# Patient Record
Sex: FEMALE | Race: WHITE | Employment: UNEMPLOYED | URBAN - METROPOLITAN AREA
[De-identification: names, ages, dates, MRNs, and addresses within clinical notes are randomized per-mention and may not be internally consistent; named-entity substitution may affect disease eponyms.]

---

## 2018-08-08 ENCOUNTER — HOSPITAL ENCOUNTER (OUTPATIENT)
Age: 23
Discharge: HOME | End: 2018-08-10
Payer: COMMERCIAL

## 2018-08-08 PROCEDURE — 88175 CYTOPATH C/V AUTO FLUID REDO: CPT

## 2019-02-14 ENCOUNTER — HOSPITAL ENCOUNTER (OUTPATIENT)
Age: 24
Discharge: HOME | End: 2019-02-16
Payer: COMMERCIAL

## 2019-02-14 PROCEDURE — 87591 N.GONORRHOEAE DNA AMP PROB: CPT

## 2019-02-14 PROCEDURE — 87624 HPV HI-RISK TYP POOLED RSLT: CPT

## 2019-02-14 PROCEDURE — 88175 CYTOPATH C/V AUTO FLUID REDO: CPT

## 2019-02-14 PROCEDURE — 87491 CHLMYD TRACH DNA AMP PROBE: CPT

## 2019-02-19 LAB
CHLAMYDIA TRACHOMATIS AMPLIFIED DET: NORMAL
N GONORRHOEAE AMPLIFIED DET: NORMAL

## 2019-02-21 LAB
CORRESPONDING PAP CASE #: NORMAL
HPV, HIGH RISK: POSITIVE

## 2019-04-02 ENCOUNTER — HOSPITAL ENCOUNTER (OUTPATIENT)
Age: 24
Discharge: HOME | End: 2019-04-04
Payer: COMMERCIAL

## 2019-04-02 PROCEDURE — 88305 TISSUE EXAM BY PATHOLOGIST: CPT

## 2019-08-19 ENCOUNTER — HOSPITAL ENCOUNTER (OUTPATIENT)
Age: 24
Discharge: HOME | End: 2019-08-21
Payer: MEDICAID

## 2019-08-19 PROCEDURE — 88175 CYTOPATH C/V AUTO FLUID REDO: CPT

## 2020-02-24 ENCOUNTER — HOSPITAL ENCOUNTER (OUTPATIENT)
Age: 25
Discharge: HOME | End: 2020-02-26
Payer: MEDICAID

## 2020-02-24 PROCEDURE — 88175 CYTOPATH C/V AUTO FLUID REDO: CPT

## 2020-10-02 ENCOUNTER — HOSPITAL ENCOUNTER (OUTPATIENT)
Age: 25
Discharge: HOME | End: 2020-10-04
Payer: MEDICAID

## 2020-10-02 PROCEDURE — 87591 N.GONORRHOEAE DNA AMP PROB: CPT

## 2020-10-02 PROCEDURE — 88175 CYTOPATH C/V AUTO FLUID REDO: CPT

## 2020-10-02 PROCEDURE — 87491 CHLMYD TRACH DNA AMP PROBE: CPT

## 2020-10-09 LAB
CHLAMYDIA BY THIN PREP: NEGATIVE
N. GONORRHOEAE DNA, THIN PREP: NEGATIVE
SOURCE: NORMAL

## 2022-05-17 ENCOUNTER — HOSPITAL ENCOUNTER (OUTPATIENT)
Dept: INFUSION THERAPY | Age: 27
Setting detail: INFUSION SERIES
Discharge: HOME | End: 2022-05-17
Payer: MEDICAID

## 2022-05-17 VITALS
SYSTOLIC BLOOD PRESSURE: 125 MMHG | OXYGEN SATURATION: 98 % | HEART RATE: 92 BPM | BODY MASS INDEX: 28.71 KG/M2 | DIASTOLIC BLOOD PRESSURE: 68 MMHG | TEMPERATURE: 97.6 F | HEIGHT: 62 IN | RESPIRATION RATE: 16 BRPM | WEIGHT: 156 LBS

## 2022-05-17 PROCEDURE — 6360000002 HC RX W HCPCS: Performed by: OBSTETRICS & GYNECOLOGY

## 2022-05-17 PROCEDURE — 96372 THER/PROPH/DIAG INJ SC/IM: CPT

## 2022-05-17 RX ADMIN — HUMAN RHO(D) IMMUNE GLOBULIN 300 MCG: 300 INJECTION, SOLUTION INTRAMUSCULAR at 14:41

## 2022-06-22 PROBLEM — O09.33 LATE PRENATAL CARE COMPLICATING PREGNANCY, THIRD TRIMESTER: Status: ACTIVE | Noted: 2022-06-22

## 2022-06-22 PROBLEM — O26.899 RH NEGATIVE STATE IN ANTEPARTUM PERIOD: Status: ACTIVE | Noted: 2022-06-22

## 2022-06-22 PROBLEM — Z34.03 PRIMIGRAVIDA IN THIRD TRIMESTER: Status: ACTIVE | Noted: 2022-06-22

## 2022-06-22 PROBLEM — U07.1 COVID-19 AFFECTING PREGNANCY IN SECOND TRIMESTER: Status: ACTIVE | Noted: 2022-06-22

## 2022-06-22 PROBLEM — O98.512 COVID-19 AFFECTING PREGNANCY IN SECOND TRIMESTER: Status: ACTIVE | Noted: 2022-06-22

## 2022-06-22 PROBLEM — O09.93 HIGH-RISK PREGNANCY, THIRD TRIMESTER: Status: ACTIVE | Noted: 2022-06-22

## 2022-06-22 PROBLEM — Z67.91 RH NEGATIVE STATE IN ANTEPARTUM PERIOD: Status: ACTIVE | Noted: 2022-06-22

## 2022-06-22 PROBLEM — O99.013 ANEMIA DURING PREGNANCY IN THIRD TRIMESTER: Status: ACTIVE | Noted: 2022-06-22

## 2022-06-22 PROBLEM — O98.812 CHLAMYDIA INFECTION AFFECTING PREGNANCY IN SECOND TRIMESTER: Status: ACTIVE | Noted: 2022-06-22

## 2022-06-22 PROBLEM — A74.9 CHLAMYDIA INFECTION AFFECTING PREGNANCY IN SECOND TRIMESTER: Status: ACTIVE | Noted: 2022-06-22

## 2022-07-25 ENCOUNTER — APPOINTMENT (OUTPATIENT)
Dept: LABOR AND DELIVERY | Age: 27
DRG: 560 | End: 2022-07-25
Payer: MEDICAID

## 2022-07-25 ENCOUNTER — HOSPITAL ENCOUNTER (INPATIENT)
Age: 27
LOS: 3 days | Discharge: HOME | DRG: 560 | End: 2022-07-28
Attending: OBSTETRICS & GYNECOLOGY | Admitting: OBSTETRICS & GYNECOLOGY
Payer: MEDICAID

## 2022-07-25 ENCOUNTER — ANESTHESIA EVENT (OUTPATIENT)
Dept: LABOR AND DELIVERY | Age: 27
DRG: 560 | End: 2022-07-25
Payer: MEDICAID

## 2022-07-25 ENCOUNTER — ANESTHESIA (OUTPATIENT)
Dept: LABOR AND DELIVERY | Age: 27
DRG: 560 | End: 2022-07-25
Payer: MEDICAID

## 2022-07-25 DIAGNOSIS — G89.18 PAIN FOLLOWING SURGERY OR PROCEDURE: Primary | ICD-10-CM

## 2022-07-25 PROBLEM — Z3A.39 39 WEEKS GESTATION OF PREGNANCY: Status: ACTIVE | Noted: 2022-07-25

## 2022-07-25 LAB
ABO/RH: NORMAL
AMPHETAMINE SCREEN, URINE: NOT DETECTED
ANTIBODY SCREEN: NORMAL
BARBITURATE SCREEN URINE: NOT DETECTED
BENZODIAZEPINE SCREEN, URINE: NOT DETECTED
CANNABINOID SCREEN URINE: NOT DETECTED
COCAINE METABOLITE SCREEN URINE: NOT DETECTED
FENTANYL SCREEN, URINE: NOT DETECTED
HCT VFR BLD CALC: 35.8 % (ref 34–48)
HEMOGLOBIN: 11.7 G/DL (ref 11.5–15.5)
Lab: NORMAL
MCH RBC QN AUTO: 27.3 PG (ref 26–35)
MCHC RBC AUTO-ENTMCNC: 32.7 % (ref 32–34.5)
MCV RBC AUTO: 83.6 FL (ref 80–99.9)
METHADONE SCREEN, URINE: NOT DETECTED
OPIATE SCREEN URINE: NOT DETECTED
OXYCODONE URINE: NOT DETECTED
PDW BLD-RTO: 18.3 FL (ref 11.5–15)
PHENCYCLIDINE SCREEN URINE: NOT DETECTED
PLATELET # BLD: 243 E9/L (ref 130–450)
PMV BLD AUTO: 11.9 FL (ref 7–12)
RBC # BLD: 4.28 E12/L (ref 3.5–5.5)
WBC # BLD: 9.2 E9/L (ref 4.5–11.5)

## 2022-07-25 PROCEDURE — 6360000002 HC RX W HCPCS: Performed by: OBSTETRICS & GYNECOLOGY

## 2022-07-25 PROCEDURE — 2500000003 HC RX 250 WO HCPCS

## 2022-07-25 PROCEDURE — 1220000001 HC SEMI PRIVATE L&D R&B

## 2022-07-25 PROCEDURE — 51701 INSERT BLADDER CATHETER: CPT

## 2022-07-25 PROCEDURE — 85027 COMPLETE CBC AUTOMATED: CPT

## 2022-07-25 PROCEDURE — 6360000002 HC RX W HCPCS

## 2022-07-25 PROCEDURE — 86900 BLOOD TYPING SEROLOGIC ABO: CPT

## 2022-07-25 PROCEDURE — 36415 COLL VENOUS BLD VENIPUNCTURE: CPT

## 2022-07-25 PROCEDURE — 80307 DRUG TEST PRSMV CHEM ANLYZR: CPT

## 2022-07-25 PROCEDURE — 2580000003 HC RX 258: Performed by: OBSTETRICS & GYNECOLOGY

## 2022-07-25 PROCEDURE — 86901 BLOOD TYPING SEROLOGIC RH(D): CPT

## 2022-07-25 PROCEDURE — 86850 RBC ANTIBODY SCREEN: CPT

## 2022-07-25 RX ORDER — SODIUM CHLORIDE, SODIUM LACTATE, POTASSIUM CHLORIDE, AND CALCIUM CHLORIDE .6; .31; .03; .02 G/100ML; G/100ML; G/100ML; G/100ML
1000 INJECTION, SOLUTION INTRAVENOUS PRN
Status: DISCONTINUED | OUTPATIENT
Start: 2022-07-25 | End: 2022-07-26

## 2022-07-25 RX ORDER — FENTANYL CITRATE 50 UG/ML
INJECTION, SOLUTION INTRAMUSCULAR; INTRAVENOUS
Status: COMPLETED
Start: 2022-07-25 | End: 2022-07-25

## 2022-07-25 RX ORDER — SODIUM CHLORIDE 9 MG/ML
25 INJECTION, SOLUTION INTRAVENOUS PRN
Status: DISCONTINUED | OUTPATIENT
Start: 2022-07-25 | End: 2022-07-26

## 2022-07-25 RX ORDER — CARBOPROST TROMETHAMINE 250 UG/ML
250 INJECTION, SOLUTION INTRAMUSCULAR PRN
Status: DISCONTINUED | OUTPATIENT
Start: 2022-07-25 | End: 2022-07-26

## 2022-07-25 RX ORDER — ONDANSETRON 2 MG/ML
4 INJECTION INTRAMUSCULAR; INTRAVENOUS EVERY 6 HOURS PRN
Status: DISCONTINUED | OUTPATIENT
Start: 2022-07-25 | End: 2022-07-26

## 2022-07-25 RX ORDER — METHYLERGONOVINE MALEATE 0.2 MG/ML
200 INJECTION INTRAVENOUS PRN
Status: DISCONTINUED | OUTPATIENT
Start: 2022-07-25 | End: 2022-07-26

## 2022-07-25 RX ORDER — FENTANYL CITRATE 50 UG/ML
INJECTION, SOLUTION INTRAMUSCULAR; INTRAVENOUS PRN
Status: DISCONTINUED | OUTPATIENT
Start: 2022-07-25 | End: 2022-07-28 | Stop reason: SDUPTHER

## 2022-07-25 RX ORDER — DOCUSATE SODIUM 100 MG/1
100 CAPSULE, LIQUID FILLED ORAL 2 TIMES DAILY
Status: DISCONTINUED | OUTPATIENT
Start: 2022-07-25 | End: 2022-07-26

## 2022-07-25 RX ORDER — ZOLPIDEM TARTRATE 10 MG/1
10 TABLET ORAL NIGHTLY PRN
COMMUNITY
End: 2022-10-07

## 2022-07-25 RX ORDER — SODIUM CHLORIDE, SODIUM LACTATE, POTASSIUM CHLORIDE, AND CALCIUM CHLORIDE .6; .31; .03; .02 G/100ML; G/100ML; G/100ML; G/100ML
500 INJECTION, SOLUTION INTRAVENOUS PRN
Status: DISCONTINUED | OUTPATIENT
Start: 2022-07-25 | End: 2022-07-26

## 2022-07-25 RX ORDER — LIDOCAINE HYDROCHLORIDE 10 MG/ML
INJECTION, SOLUTION INFILTRATION; PERINEURAL
Status: COMPLETED
Start: 2022-07-25 | End: 2022-07-26

## 2022-07-25 RX ORDER — SODIUM CHLORIDE, SODIUM LACTATE, POTASSIUM CHLORIDE, CALCIUM CHLORIDE 600; 310; 30; 20 MG/100ML; MG/100ML; MG/100ML; MG/100ML
INJECTION, SOLUTION INTRAVENOUS CONTINUOUS
Status: DISCONTINUED | OUTPATIENT
Start: 2022-07-25 | End: 2022-07-26

## 2022-07-25 RX ORDER — MISOPROSTOL 200 UG/1
800 TABLET ORAL PRN
Status: DISCONTINUED | OUTPATIENT
Start: 2022-07-25 | End: 2022-07-26

## 2022-07-25 RX ORDER — ACETAMINOPHEN 650 MG
TABLET, EXTENDED RELEASE ORAL
Status: COMPLETED
Start: 2022-07-25 | End: 2022-07-26

## 2022-07-25 RX ORDER — SODIUM CHLORIDE 0.9 % (FLUSH) 0.9 %
5-40 SYRINGE (ML) INJECTION PRN
Status: DISCONTINUED | OUTPATIENT
Start: 2022-07-25 | End: 2022-07-26

## 2022-07-25 RX ADMIN — Medication 1 MILLI-UNITS/MIN: at 10:55

## 2022-07-25 RX ADMIN — SODIUM CHLORIDE, POTASSIUM CHLORIDE, SODIUM LACTATE AND CALCIUM CHLORIDE: 600; 310; 30; 20 INJECTION, SOLUTION INTRAVENOUS at 10:01

## 2022-07-25 RX ADMIN — FENTANYL CITRATE 25 MCG: 50 INJECTION, SOLUTION INTRAMUSCULAR; INTRAVENOUS at 18:44

## 2022-07-25 RX ADMIN — Medication 15 ML/HR: at 18:50

## 2022-07-25 ASSESSMENT — LIFESTYLE VARIABLES: SMOKING_STATUS: 0

## 2022-07-25 NOTE — ANESTHESIA PROCEDURE NOTES
CSE Block    Patient location during procedure: OB  Reason for block: labor epidural  Staffing  Performed: other anesthesia staff   Anesthesiologist: Shanita Garcia MD  Resident/CRNA: APRIL Linares - CRNA  Other anesthesia staff: Emmanuelle Alfaro RN  CSE  Patient position: sitting  Prep: ChloraPrep  Patient monitoring: continuous pulse ox  Approach: midline  Provider prep: mask and sterile gloves  Spinal Needle  Needle type: Sprotte tip   Needle gauge: 25 G  Needle length: 4.75 in  Epidural Needle  Injection technique: SHAYY air  Needle type: Tuohy   Needle gauge: 17 G  Needle length: 3.5 in  Needle insertion depth: 7 cm  Catheter  Catheter type: end hole  Catheter size: 20.  Catheter at skin depth: 12 cm  Test dose: positive (Pt HR increased.)  Assessment  Hemodynamics: stable  Additional Notes  First epidural placed and had to be removed for positive test dose. Second epidural placed with negative test dose, taped in place.  Pt tolerated well, vital signs stable and HR returned to baseline  Preanesthetic Checklist  Completed: patient identified, IV checked, site marked, risks and benefits discussed, surgical/procedural consents, equipment checked, pre-op evaluation, timeout performed, anesthesia consent given, oxygen available, monitors applied/VS acknowledged and blood product R/B/A discussed and consented
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
## 2022-07-25 NOTE — PROGRESS NOTES
L&D PROGRESS NOTE:    Patient:  Laureen Stewart     Admit Date:  7/25/2022  9:13 AM  Medical Record Number:  [id number]   Today's Date: 7/25/2022    S: Dr Jessica Waite MD requesting placement of IUPC. O:   Vitals:    07/25/22 1110 07/25/22 1139 07/25/22 1140 07/25/22 1356   BP:  (!) 137/98  (!) 145/91   Pulse:  (!) 108  88   Resp:       Temp:       TempSrc:       SpO2:   97%    Weight: 182 lb (82.6 kg)      Height: 5' 2\" (1.575 m)        FHR:  Reassuring. Cervix: 2 cm / 80% / soft / -1. TOCO:  2 minutes-3 minutes    A:27 y.o. w female at 43w3d Sam Gigi  High risk primigravida  Gestational hypertension  COVID-19 affecting pregnancy at 18 weeks  Late prenatal care  Iron deficiency anemia pregnancy  Rh- status  History of chlamydia in second trimester (treated with negative test of cure)  History of anxiety/depression/ADHD on meds prior to pregnancy   Patient Active Problem List   Diagnosis    Anxiety/Depression/ADHD disorder - on meds since 9 yo    High-risk pregnancy, third trimester    Primigravida in third trimester    COVID-19 affecting pregnancy in second trimester at around 18 weeks along (EFW/KARLI at 29, 29 and 38 weeks)     Late prenatal care complicating pregnancy, third trimester (16 4/7 weeks)    Anemia during pregnancy in third trimester- Taking FeSO4 BID    Rh negative state in antepartum period- Had rhogam    Chlamydia infection affecting pregnancy in second trimester (16 weeks -> treated -> OLIVIA negative    39 4/7 weeks gestation of pregnancy    Gestational hypertension, third trimester starting at 37 weeks     Fetal status: Reassuring  GBS: negative    P: AROM without difficulty using the [place]. Ruptured light meconium. IUPC placed posteriorly without difficulty. IV bolus/O2/position changes prn  See orders per Dr. Jessica Waite MD.    Jessica Waite MD, M.D.  FACOG  7/25/2022 2:15 PM

## 2022-07-25 NOTE — PROGRESS NOTES
Spoke with Dr. Mary Clemente in patient room, updated on BP's, gbs status. declines pih labs. New orders noted.
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
## 2022-07-25 NOTE — PROGRESS NOTES
.39w4d. Patient presents for scheduled induction. Denies vb, lof, ctx. Reports good fm. Efm placed. Call light within reach.

## 2022-07-26 LAB
FETAL SCREEN: NORMAL
RHIG LOT NUMBER: NORMAL

## 2022-07-26 PROCEDURE — 6360000002 HC RX W HCPCS: Performed by: OBSTETRICS & GYNECOLOGY

## 2022-07-26 PROCEDURE — 85461 HEMOGLOBIN FETAL: CPT

## 2022-07-26 PROCEDURE — 7200000001 HC VAGINAL DELIVERY

## 2022-07-26 PROCEDURE — 2500000003 HC RX 250 WO HCPCS

## 2022-07-26 PROCEDURE — 36415 COLL VENOUS BLD VENIPUNCTURE: CPT

## 2022-07-26 PROCEDURE — 1220000000 HC SEMI PRIVATE OB R&B

## 2022-07-26 PROCEDURE — 6370000000 HC RX 637 (ALT 250 FOR IP): Performed by: OBSTETRICS & GYNECOLOGY

## 2022-07-26 RX ORDER — OXYCODONE HYDROCHLORIDE 5 MG/1
5 TABLET ORAL EVERY 4 HOURS PRN
Status: DISCONTINUED | OUTPATIENT
Start: 2022-07-26 | End: 2022-07-28 | Stop reason: HOSPADM

## 2022-07-26 RX ORDER — MODIFIED LANOLIN
OINTMENT (GRAM) TOPICAL PRN
Status: DISCONTINUED | OUTPATIENT
Start: 2022-07-26 | End: 2022-07-28 | Stop reason: HOSPADM

## 2022-07-26 RX ORDER — OXYCODONE HYDROCHLORIDE 5 MG/1
10 TABLET ORAL EVERY 4 HOURS PRN
Status: DISCONTINUED | OUTPATIENT
Start: 2022-07-26 | End: 2022-07-28 | Stop reason: HOSPADM

## 2022-07-26 RX ORDER — ACETAMINOPHEN 500 MG
1000 TABLET ORAL EVERY 8 HOURS PRN
Status: DISCONTINUED | OUTPATIENT
Start: 2022-07-26 | End: 2022-07-28 | Stop reason: HOSPADM

## 2022-07-26 RX ORDER — DOCUSATE SODIUM 100 MG/1
100 CAPSULE, LIQUID FILLED ORAL 2 TIMES DAILY
Status: DISCONTINUED | OUTPATIENT
Start: 2022-07-26 | End: 2022-07-28 | Stop reason: HOSPADM

## 2022-07-26 RX ORDER — IBUPROFEN 600 MG/1
600 TABLET ORAL EVERY 6 HOURS PRN
Status: DISCONTINUED | OUTPATIENT
Start: 2022-07-26 | End: 2022-07-28 | Stop reason: HOSPADM

## 2022-07-26 RX ORDER — PRENATAL WITH FERROUS FUM AND FOLIC ACID 3080; 920; 120; 400; 22; 1.84; 3; 20; 10; 1; 12; 200; 27; 25; 2 [IU]/1; [IU]/1; MG/1; [IU]/1; MG/1; MG/1; MG/1; MG/1; MG/1; MG/1; UG/1; MG/1; MG/1; MG/1; MG/1
1 TABLET ORAL
Status: DISCONTINUED | OUTPATIENT
Start: 2022-07-26 | End: 2022-07-28 | Stop reason: HOSPADM

## 2022-07-26 RX ADMIN — OXYCODONE 10 MG: 5 TABLET ORAL at 20:04

## 2022-07-26 RX ADMIN — IBUPROFEN 600 MG: 600 TABLET ORAL at 03:34

## 2022-07-26 RX ADMIN — HUMAN RHO(D) IMMUNE GLOBULIN 300 MCG: 300 INJECTION, SOLUTION INTRAMUSCULAR at 16:22

## 2022-07-26 RX ADMIN — Medication 87.3 MILLI-UNITS/MIN: at 00:38

## 2022-07-26 RX ADMIN — Medication: at 00:17

## 2022-07-26 RX ADMIN — IBUPROFEN 600 MG: 600 TABLET ORAL at 11:44

## 2022-07-26 RX ADMIN — Medication 909 MILLI-UNITS/MIN: at 00:27

## 2022-07-26 RX ADMIN — OXYCODONE 5 MG: 5 TABLET ORAL at 16:03

## 2022-07-26 RX ADMIN — Medication: at 03:35

## 2022-07-26 RX ADMIN — LIDOCAINE HYDROCHLORIDE: 10 INJECTION, SOLUTION INFILTRATION; PERINEURAL at 01:08

## 2022-07-26 RX ADMIN — DOCUSATE SODIUM 100 MG: 100 CAPSULE, LIQUID FILLED ORAL at 20:05

## 2022-07-26 RX ADMIN — DOCUSATE SODIUM 100 MG: 100 CAPSULE, LIQUID FILLED ORAL at 11:44

## 2022-07-26 ASSESSMENT — PAIN DESCRIPTION - DESCRIPTORS
DESCRIPTORS: BURNING;SORE;TENDER
DESCRIPTORS: CRAMPING
DESCRIPTORS: SORE

## 2022-07-26 ASSESSMENT — PAIN DESCRIPTION - ORIENTATION
ORIENTATION: MID;LOWER
ORIENTATION: LOWER

## 2022-07-26 ASSESSMENT — PAIN DESCRIPTION - PAIN TYPE
TYPE: ACUTE PAIN
TYPE: ACUTE PAIN

## 2022-07-26 ASSESSMENT — PAIN DESCRIPTION - LOCATION
LOCATION: PERINEUM
LOCATION: ABDOMEN
LOCATION: ABDOMEN

## 2022-07-26 ASSESSMENT — PAIN - FUNCTIONAL ASSESSMENT
PAIN_FUNCTIONAL_ASSESSMENT: ACTIVITIES ARE NOT PREVENTED

## 2022-07-26 ASSESSMENT — PAIN DESCRIPTION - FREQUENCY
FREQUENCY: INTERMITTENT
FREQUENCY: INTERMITTENT

## 2022-07-26 ASSESSMENT — PAIN SCALES - GENERAL
PAINLEVEL_OUTOF10: 7
PAINLEVEL_OUTOF10: 3
PAINLEVEL_OUTOF10: 6

## 2022-07-26 ASSESSMENT — PAIN DESCRIPTION - ONSET: ONSET: GRADUAL

## 2022-07-26 NOTE — PROGRESS NOTES
Patient and significant other oriented to room and unit on admission. Reviewed safe sleep, visitation, patient right's, and admission paperwork. Instructed patient to call cell phone number provided or press call light if anything is needed. Call light within reach. All questions and concerns addressed at this time.

## 2022-07-26 NOTE — PROGRESS NOTES
Assisted to restroom, equal strength in both legs, denies weakness , no dizziness, ambulated without difficulty, instructed on trung care and returned to bed.

## 2022-07-26 NOTE — PROGRESS NOTES
Called and updated Dr Colin Space on patient prolonged deceleration, pitocin paused and LR bolus going. Updated Doctor on strip and SVE orders given.

## 2022-07-26 NOTE — LACTATION NOTE
Instructed on normal infant behavior, benefits of colostrum/breast milk for baby and mom,  benefits of skin to skin and components of safe positioning. Encouraged rooming-in and avoidance of pacifier use until breastfeeding is well established. Reviewed latch techniques, positioning, signs of effective milk transfer, waking techniques and the importance of frequent feedings- 8-12 times/ 24 hrs to stimulate/maintain milk production. Taught hand expression and encouraged to express drops of colostrum at start of feeding. Reviewed hunger cues and expected urine/stool output and transition. Encouraged to feed infant as often and for as long as the infant wishes to do so. Assisted with positioning. Baby was tired but did not latch. Went over breastfeeding resources available after discharge. Offered support and encouraged to call for assistance or concerns.

## 2022-07-26 NOTE — L&D DELIVERY NOTE
Department of Obstetrics and Gynecology  Spontaneous Vaginal Delivery Note    Patient:  Matthieu Tarango     Admit Date:  2022  9:13 AM  Medical Record Number:  [id number]   Procedure Date: 2022 1:42 AM     Pre-delivery Diagnosis:  High risk primigravida at 44 5/7w; Gestational hypertension; COVID-19 affecting pregnancy at 18 weeks; Late prenatal care; Iron deficiency anemia pregnancy  Rh- status; History of chlamydia in second trimester (treated with negative test of cure);  History of anxiety/depression/ADHD on meds prior to pregnancy  Patient Active Problem List   Diagnosis    Anxiety/Depression/ADHD disorder - on meds since 11 yo    High-risk pregnancy, third trimester    Primigravida in third trimester    COVID-19 affecting pregnancy in second trimester at around 18 weeks along (EFW/KARLI at 29, 29 and 38 weeks)     Late prenatal care complicating pregnancy, third trimester (16 4/7 weeks)    Anemia during pregnancy in third trimester- Taking FeSO4 BID    Rh negative state in antepartum period- Had rhogam    Chlamydia infection affecting pregnancy in second trimester (16 weeks -> treated -> OLIVIA negative    39 5/7 weeks gestation of pregnancy    Gestational hypertension, third trimester starting at 37 weeks    Meconium in amniotic fluid     Post-delivery Diagnosis:  Living  infant Male, second-degree perineal laceration, right periurethral laceration  Patient Active Problem List   Diagnosis    Anxiety/Depression/ADHD disorder - on meds since 11 yo    High-risk pregnancy, third trimester    Primigravida in third trimester    COVID-19 affecting pregnancy in second trimester at around 18 weeks along (EFW/KARLI at 29, 29 and 38 weeks)     Late prenatal care complicating pregnancy, third trimester (16 4/7 weeks)    Anemia during pregnancy in third trimester- Taking FeSO4 BID    Rh negative state in antepartum period- Had rhogam    Chlamydia infection affecting pregnancy in second trimester (16 weeks -> treated -> OLIVIA negative    39 5/7 weeks gestation of pregnancy    Gestational hypertension, third trimester starting at 37 weeks    Meconium in amniotic fluid     (normal spontaneous vaginal delivery)    Term birth of male     Second degree laceration of perineum, delivered, current hospitalization    Right periurethral laceration, delivered, current hospitalization       Information for the patient's :  Chano Arellano [address] [facility] [[id number]]   Normal Spontaneous Vaginal Delivery, uncomplicated     Delivering Physician: Sarahy Villegas Wt:   Information for the patient's :  Chano Nugent [[id number]]   8 pounds 6.4 ounces  3810 g     APGARS:     Information for the patient's :  Chano Nugent [[id number]]   1 minute: 9  5 minute: 9    Anesthesia:  epidural anesthesia 1% lidocaine locally for repair    Application and Delivery:  32 y.o. W female  at 39w5d admitted for Pitocin induction of labor with a Tiwari score equal to 4 gestational hypertension and a history of COVID-19 at 18 weeks who progressed to complete post amniotomy and delivered Cephalic, right occiput anterior presentation via  @ 0018, under epidural anesthesia anesthesia,  over an intact perineum with a resulting 2nd degree laceration and a right periurethral mucosal laceration, without dystocia or complication, a Live Born Male infant, weighing 8# 6oz, 3810 grams, Meconium fluid at delivery, bulb suctioned on perineum. A nuchal cord was not present. A delayed cord clamping was performed. Spontaneous cry,  Apgar's 1 minute:  9; 5 minute:  9. The placenta was delivered with gentle traction and was noted to be intact, whole, and that the umbilical cord had three vessels noted. Episiotomy was not needed due to a spontaneous 2nd degree (subcutaneous tissue involved) perineal/vaginal laceration and a right periurethral mucosal laceration.   Repair of the perineal laceration was performed in layers, per routine with Vicryl 3-0 suture. The right urethral laceration was repaired with simple interrupted stitches of the same suture. Cervix, rectum, sphincter intact. Sponge, needle, and instrument counts correct x 2. Delivery Summary:  Mother's Information      Labor Events     Labor?: No  Cervical Ripening: N/A  Now     Iantha Frankel [[id number]]      Labor Events     Labor?: No   Steroids?: None  Cervical Ripening Date/Time: N/    Antibiotics Received during Labor?: No  Rupture Identifier: Sac 1   Rupture Date/Time: 22 13:50:00   Rupture Type: AROM  Fluid Color: Meconium  Meconium Consistency: Thin  Fluid Odor: None  Fluid Volume:  Moderate  Induction: Oxytocin  Labor Complications: None    Anesthesia    Method: Epidural, Local    Start Pushing      Labor onset date/time: 21 19:29:00 Now     Dilation complete date/time: 22 23:10:00 EDT Now     Start pushing date/time: 2022 23:20:00     Labor Length    1st stage: 3h 41m  2nd stage: 1h 08m  3rd stage: 0h 09m    Delivery ()      Delivery Date/Time:  22 00:18:00   Delivery Type: Vaginal, Spontaneous     Presentation    Presentation: Vertex  Position: Right  _: Occiput  _: Anterior    Shoulder Dystocia    Shoulder Dystocia Present?: No  Assisted Delivery Details    Forceps Attempted?: No  Vacuum Extractor Attempted?: No    Cord    Vessels: 3 Vessels  Complications: None  Delayed Cord Clamping?: Yes  Cord Clamped Date/Time: 2022 12:19:00  Cord Blood Disposition: Lab  Gases Sent?: Yes    Placenta    Date/Time: 2022 00:27:00  Removal: Spontaneous  Appearance: Intact  Disposition: Placenta Refrigerator    Lacerations    Episiotomy: None  Perineal Lacerations: 2nd  Other Lacerations: periurethral laceration  Periurethral Laceration: Right Repaired?: Yes   Number of Repair Packets: 3    Vaginal Counts    Initial Count Personnel: DONALDO  Initial Count Verified By: Juancho Hall    Sponges Needles Instruments   Initial Counts Correct Correct Correct   Final Counts Correct Correct Correct   Final Count Personnel: Rosana Bearden  Final Count Verified By: [place]  Accurate Final Count?: Yes  If the count is incorrect due to Intentionally Retained Foreign Object (IRFO) add the IRFO LDA in Lines/Drains. Add LDA: Link to [facility]    Blood Loss  Mother: [facility] #[id number]     Start of Mother's Information      Delivery Blood Loss  21 1929 - 22 0018      175 mL       End of Mother's Information  Mother: [facility] #[id number]      Delivery Providers    Delivering clinician: Kaia Main MD     Provider Role    Kaia Main MD Obstetrician    Pablo Rojas RN Primary Nurse    Kristi Snell RN Primary [place] Nurse          Assessment    Living Status: Living     Apgar Scoring Key:    0 1 2    Skin Color: Blue or pale Acrocyanotic Completely pink    Heart Rate: Absent <100 bpm >100 bpm    Reflex Irritability: No response Grimace Cry or active withdrawal    Muscle Tone: Limp Some flexion Active motion    Respiratory Effort: Absent Weak cry; hypoventilation Good, crying                      Skin Color:   Heart Rate:   Reflex Irritability:   Muscle Tone:   Respiratory Effort:    Total:            1 Minute:    1    2    2    2    2    9        Apgar 1 total from OB History    5 Minute:    1    2    2    2    2    9        Apgar 5 total from OB History              Apgars Assigned By: Chet Reyes         Resuscitation    Method: Bulb Suction, Room Air, Stimulation        [place] Measurements      Birth Weight: 3810 g Birth Length: 0.521 m     Head Circumference: 0.345 m          Title      Skin to Skin Initiation Date/Time: 22 00:18:00 EDT     Skin to Skin With: Mother     Skin to Skin End Date/Time: 22 01:40:00     Breastfeeding Initiated Date/Time: 2022 01:45:00      Specimen: None     Quantitative blood loss: 175    Condition:  infant stable to general nursery and mother stable to maternity    Blood Type and Rh: A NEG    Rubella Immunity Status:   Immune         Infant Feeding:    breast    Attending Attestation: I performed the procedure.     Kaia Main MD MD, Anthony Vo  7/26/2022 1:42 AM

## 2022-07-26 NOTE — ANESTHESIA POSTPROCEDURE EVALUATION
Department of Anesthesiology  Postprocedure Note    Patient: Priyanka Ramos  MRN: [id number]  YOB: 1995  Date of evaluation: 7/26/2022      Procedure Summary     Date: 07/25/22 Room / Location:     [facility]est[facility] Stop:     Procedure: Labor Analgesia Diagnosis:     Scheduled Providers:  Responsible Provider: Shanita Garcia MD    Anesthesia Type: epidural ASA Status: 3          Anesthesia Type: No value filed.     Angeline Phase I: Angeline Score: 10    Angeline Phase II:        Anesthesia Post Evaluation    Patient location during evaluation: bedside  Patient participation: complete - patient participated  Level of consciousness: awake and alert  Pain score: 2  Airway patency: patent  Nausea & Vomiting: no nausea and no vomiting  Complications: no  Cardiovascular status: blood pressure returned to baseline and hemodynamically stable  Respiratory status: acceptable  Hydration status: stable

## 2022-07-27 PROCEDURE — 6370000000 HC RX 637 (ALT 250 FOR IP): Performed by: OBSTETRICS & GYNECOLOGY

## 2022-07-27 PROCEDURE — 1220000000 HC SEMI PRIVATE OB R&B

## 2022-07-27 RX ORDER — CITALOPRAM 20 MG/1
20 TABLET ORAL DAILY
Status: DISCONTINUED | OUTPATIENT
Start: 2022-07-27 | End: 2022-07-28 | Stop reason: HOSPADM

## 2022-07-27 RX ADMIN — DOCUSATE SODIUM 100 MG: 100 CAPSULE, LIQUID FILLED ORAL at 08:53

## 2022-07-27 RX ADMIN — OXYCODONE 10 MG: 5 TABLET ORAL at 02:24

## 2022-07-27 RX ADMIN — IBUPROFEN 600 MG: 600 TABLET ORAL at 20:42

## 2022-07-27 RX ADMIN — IBUPROFEN 600 MG: 600 TABLET ORAL at 08:53

## 2022-07-27 RX ADMIN — CITALOPRAM HYDROBROMIDE 20 MG: 20 TABLET ORAL at 20:42

## 2022-07-27 ASSESSMENT — PAIN DESCRIPTION - LOCATION
LOCATION: ABDOMEN
LOCATION: ABDOMEN;VAGINA
LOCATION: ABDOMEN

## 2022-07-27 ASSESSMENT — PAIN DESCRIPTION - FREQUENCY: FREQUENCY: INTERMITTENT

## 2022-07-27 ASSESSMENT — PAIN - FUNCTIONAL ASSESSMENT
PAIN_FUNCTIONAL_ASSESSMENT: ACTIVITIES ARE NOT PREVENTED
PAIN_FUNCTIONAL_ASSESSMENT: ACTIVITIES ARE NOT PREVENTED

## 2022-07-27 ASSESSMENT — PAIN DESCRIPTION - ONSET: ONSET: ON-GOING

## 2022-07-27 ASSESSMENT — PAIN SCALES - GENERAL
PAINLEVEL_OUTOF10: 3
PAINLEVEL_OUTOF10: 0
PAINLEVEL_OUTOF10: 3
PAINLEVEL_OUTOF10: 7

## 2022-07-27 ASSESSMENT — PAIN DESCRIPTION - DESCRIPTORS
DESCRIPTORS: CRAMPING
DESCRIPTORS: CRAMPING
DESCRIPTORS: SORE

## 2022-07-27 ASSESSMENT — PAIN DESCRIPTION - PAIN TYPE: TYPE: ACUTE PAIN

## 2022-07-27 ASSESSMENT — PAIN DESCRIPTION - ORIENTATION: ORIENTATION: LOWER

## 2022-07-27 NOTE — CARE COORDINATION
SW Discharge Planning   SW received consult for \" ppd score 20\"     LUIS ALBERTO met with Sallie Pedersen and provided her with support, education and resources for post partum depression.  Due to how high Jovanna's score is, Jovanna's nurseLj also reported that she would alert physician      Electronically signed by GRANT Solano on 7/27/2022 at 2:47 PM

## 2022-07-27 NOTE — PROGRESS NOTES
[employer] [place] Hearing screening results were discussed with parent. Questions answered. Brochure given to parent. Advised to monitor developmental milestones and contact physician for any concerns.    Chandrakant Cole, AuD

## 2022-07-27 NOTE — PROGRESS NOTES
Pt tearful when entered room, said  she has been feeling anxious. Pt was taking Celexa before pregnancy. Will update physician.

## 2022-07-28 VITALS
DIASTOLIC BLOOD PRESSURE: 82 MMHG | BODY MASS INDEX: 33.49 KG/M2 | HEART RATE: 64 BPM | OXYGEN SATURATION: 96 % | WEIGHT: 182 LBS | TEMPERATURE: 98.1 F | RESPIRATION RATE: 16 BRPM | HEIGHT: 62 IN | SYSTOLIC BLOOD PRESSURE: 140 MMHG

## 2022-07-28 PROCEDURE — 6370000000 HC RX 637 (ALT 250 FOR IP): Performed by: OBSTETRICS & GYNECOLOGY

## 2022-07-28 RX ORDER — OXYCODONE HYDROCHLORIDE 5 MG/1
5 TABLET ORAL EVERY 6 HOURS PRN
Qty: 8 TABLET | Refills: 0 | Status: SHIPPED | OUTPATIENT
Start: 2022-07-28 | End: 2022-07-30

## 2022-07-28 RX ORDER — MODIFIED LANOLIN
1 OINTMENT (GRAM) TOPICAL PRN
COMMUNITY
Start: 2022-07-28 | End: 2022-10-07

## 2022-07-28 RX ORDER — PSEUDOEPHEDRINE HCL 30 MG
100 TABLET ORAL 2 TIMES DAILY PRN
COMMUNITY
Start: 2022-07-28 | End: 2022-10-07

## 2022-07-28 RX ORDER — CITALOPRAM 20 MG/1
20 TABLET ORAL DAILY
Qty: 90 TABLET | Refills: 1 | Status: SHIPPED | OUTPATIENT
Start: 2022-07-29 | End: 2022-10-07 | Stop reason: SDUPTHER

## 2022-07-28 RX ORDER — IBUPROFEN 600 MG/1
600 TABLET ORAL EVERY 6 HOURS PRN
Qty: 60 TABLET | Refills: 1 | Status: SHIPPED | OUTPATIENT
Start: 2022-07-28 | End: 2022-10-07

## 2022-07-28 RX ADMIN — CITALOPRAM HYDROBROMIDE 20 MG: 20 TABLET ORAL at 13:00

## 2022-07-28 RX ADMIN — IBUPROFEN 600 MG: 600 TABLET ORAL at 10:49

## 2022-07-28 RX ADMIN — METFORMIN HYDROCHLORIDE 1 TABLET: 500 TABLET, EXTENDED RELEASE ORAL at 10:48

## 2022-07-28 RX ADMIN — DOCUSATE SODIUM 100 MG: 100 CAPSULE, LIQUID FILLED ORAL at 10:49

## 2022-07-28 RX ADMIN — Medication: at 10:49

## 2022-07-28 ASSESSMENT — PAIN SCALES - GENERAL: PAINLEVEL_OUTOF10: 4

## 2022-07-28 ASSESSMENT — PAIN DESCRIPTION - LOCATION: LOCATION: ABDOMEN

## 2022-07-28 NOTE — PROGRESS NOTES
Patient discharged home in stable condition with infant. Discharge instructions reviewed with patient and she verbalized an understanding.   Mom in wheelchair and infant in car seat

## 2022-07-28 NOTE — PROGRESS NOTES
PPD #1    Patient:  Jasmine Meraz     Admit Date:  2022  9:13 AM  Medical Record Number:  [id number]   Today's Date: 2022    S: No complaints -doing well, denies headaches/visual changes, midepigastric/right upper quadrant pain; tolerating diet: yes -general; ambulating well: yes -up in room; voiding without difficulty:  yes -has no urinary complaints; bm: yes x2-normal; flatus: yes -normal; pain meds appropriate: yes -ibuprofen and Roxicodone; vaginal bleeding: Less than a period. Would like started back on her Celexa for anxiety.     O:   Vitals:    22 1446 22 1859   BP: 122/65 (!) 141/84   Pulse: 75 82   Resp: 18 18   Temp: 98.3 °F (36.8 °C) 98.1 °F (36.7 °C)   TempSrc: Oral Oral   SpO2: 97% 96%   Weight:     Height:       Gen: A&O, cooperative, pleasant   Heart: RRR   Lungs: CTA b/l   Abd; soft, NT, non distended, +BS  Back: no CVA or paraspinal tenderness   Ext: No clubbing, cyanosis, edema:trace , no cords palpable, no calf tenderness   Neuro: intact   Uterus: firm, well contracted, nt   Perineum: intact, healing well   Alma pad: staining only, thin lochia    Lab Results   Component Value Date    HGB 11.7 2022    HCT 35.8 2022      Recent Results (from the past 72 hour(s))   FETAL SCREEN    Collection Time: 22  6:23 AM   Result Value Ref Range    Fetal Screen NEG    RH IMMUNE GLOBULIN    Collection Time: 22  6:23 AM   Result Value Ref Range    RHIG LOT NUMBER       RhImmuneGlobulin    [id number]          issued       1 vial  22 15:50       A: 32 y.o. female  at 39w5d weeks  PPD #1 S/P ; Episiotomy was not needed due to a spontaneous 2nd degree (subcutaneous tissue involved) vaginal/perineal laceration and a right periurethral laceration  Anxiety disorder, on medication since age 10 (Celexa 20 mg)  Iron deficiency anemia in early pregnancy, resolved with iron replacement therapy  Patient Active Problem List   Diagnosis    Anxiety/Depression/ADHD disorder - on meds since 9 yo    High-risk pregnancy, third trimester    Primigravida in third trimester    COVID-19 affecting pregnancy in second trimester at around 18 weeks along (EFW/KARLI at 29, 29 and 38 weeks)     Late prenatal care complicating pregnancy, third trimester (16 4/7 weeks)    Anemia during pregnancy in third trimester- Taking FeSO4 BID    Rh negative state in antepartum period- Had rhogam    Chlamydia infection affecting pregnancy in second trimester (16 weeks -> treated -> OLIVIA negative    39 5/7 weeks gestation of pregnancy    Gestational hypertension, third trimester starting at 37 weeks    Meconium in amniotic fluid     (normal spontaneous vaginal delivery)    Term birth of male     Second degree laceration of perineum, delivered, current hospitalization    Right periurethral laceration, delivered, current hospitalization       P: Routine PP care. Continue ibuprofen and Roxicodone as needed for pain  Resume prenatal vitamins with iron daily. Restart Celexa 20 mg. Home tomorrow.     Rehana Sanchez MD FACOG  2022 8:00 PM

## 2022-07-28 NOTE — DISCHARGE INSTRUCTIONS
Follow up with your OB doctor in 2 week for a  delivery or in 6 weeks for a vaginal delivery unless otherwise instructed, call the office for appointment. .  For breastfeeding support, you can contact our lactation specialists at [phone] or   [phone]. DIET  Eat a well balanced diet focusing on foods high in fiber and protein  Drink plenty of fluids especially water. To avoid constipation you may take a mild stool softener as recommended by your doctor or midwife. ACTIVITY  Gradually increase your activity. Resume exercise regimen only after advised by your doctor or midwife. Avoid lifting anything heavier than your baby or a gallon of milk until OK'd by your physician or midlife. Avoid driving until your doctor or midwife has given their approval.  Wandra Potter slowly from a lying to sitting and then a standing position. Climb stairs one at a time. Use caution when carrying your baby up and down the stairs. No sexual activity for 6 weeks or until advised by your doctor - Nothing in vagina: intercourse, tampons, or douching. Be prepared to discuss family planning at your follow-up OB visit. You may feel tired or have a lack of energy. You may continue your prenatal vitamin to replenish nutrients post delivery. Nap when infant naps to catch up on sleep. May return to work or school in 6 weeks or as directed by physician or midlife. Take pain medication as prescribed whenever you need them  Take care of yourself by sleeping/resting as much as possible. Let someone else care for you, your infant and housework as much as possible for a while. EMOTIONS  You may feed talavera, sad, teary, & overwhelmed. If infant will not stop crying, contact another adult for help or place infant in their crib on their back and take a break. NEVER shake your infant.     Call your doctor if you have unrelieved feelings of: inability to cope, anxiety, lack of interest in the infant/family, eating and sleeping disturbances,     BLEEDING  Vaginal bleeding will decrease in amount over the next few weeks. It should be like the end of your period like a brownish discharge. No different odor or color than a regular period. You will notice that as your activity increases, your flow may increase. This is your body's way of telling you, you need to take things easier and rest more often. Abdominal cramping should not get any worse than it is now. Take your pain medications as needed for the next few days. BREAST CARE  For breastfeeding moms:  If you become engorged, feeding may be more difficult or painful for 1-2 days. You may find it helpful to hand express some milk so that the infant can latch on more easily. While breastfeeding, continue to take your prenatal vitamins as directed by your doctor or midwife. Only take medications verified as safe for breastfeeding. If you start any new medications other than the ones you have had in the hospital, contact your pediatrician to see if they are safe for breastfeeding. Wear a support bra 24/7. You can pump your milk after breast feeding and freeze milk for six months in the freezer. When you are ready to use it, thaw it out in the refrigerator and use in 24 hours. Label the containers made for breast milk with day and time of pumping. Establish breast feeding for 2 weeks before you pump breast milk to save. Use your lanolin ointment/cream on your nipples after baby nurses. You need not wipe it off when baby nurses again. There are nipple shields and disks for sore nipples. Babies should eat every two to three hours. Avoid gassy foods (cabbage, onions, saurkraut, baked beans, cauliflower, broccoli) and spicy [place] or [place] foods. They might give the baby gas or make your milk taste funny to baby.  But if you have a craving, eat the food and see if it affects the baby and if it does, delete it from your diet. If it does not affect the baby, go ahead and eat it. Avoid caffeine at bedtime. (chocolate has a lot of caffeine) if the baby is sensitive to it. For non-breastfeeding moms:  You may apply ice packs to your breasts over your bra for twenty minutes at a time for comfort. Avoid stimulation to your breasts, when showering allow the water to strike your back not your breasts. Wear a good fitting bra until your milk dries, such as a sports bra. If your breasts are very sore, buy a cabbage and wet some of the inner leaves and place in your bra over your breasts. Your breasts may feel warm when your milk comes in. This is normal.    INCISIONAL CARE / PALMER CARE  Clean your incision in the shower with mild soap. After shower pat the incision area dry and leave open to air. If used, Steri-stipes should fall off in shower by 2 weeks. If used, Nayeli Muriel should be removed by the OB in office by 1 week. If used/ordered, an abdominal binder may provide support for your incision. Use the palmer-bottle after toileting until bleeding stops. It promotes healing and prevents infection. You are prone to urinary tract infections after a delivery ( c section or vaginal delivery). Drink a lot of fluids. Take a shower instead of a tub bath until bleeding stops. Cleanse your perineum from front to back  If used, stitches or internal clips will dissolve in 4-6 weeks. You may use a sitz bath or soak in a clean tub as needed for comfort. Kegel exercises will help restore bladder control. You may use cool compress on incision site. SWELLING   It takes about 2 weeks to get rid of all of the extra fluid you have from having a baby. Your feet and hands may swell up more than they are now. Keep your legs elevated when sitting or lying. When wearing stocking or socks, make sure they are not too tight. WHEN TO CALL THE DOCTOR  If you have a temp of 100.4 or more.    Your abdomen is tender to touch.  You are passing blood clots bigger than the size of a lemon. If you are experiencing extreme weakness or dizziness. If you are having flu-like symptoms such as achy muscles or joints. If you have pain that cannot be relieved. You have persistent burning with urination or frequency. You have swelling, bleeding, drainage, foul odor, redness, or warmth in/around your incision or stitches. You have a red, warm, tender area in you calf. Call if you have concerns about your well-being or if you feel you may be showing signs of post partum depression, or have thoughts of harming yourself or infant. Increased pain at the site of the episiotomy. Difficulty urinating. Difficulty breathing with or without chest pain. Headache not relieved by pain meds. If you are saturating more than one maxi pad in an hour, foul smelling vaginal discharge, sudden continuing increased vaginal bleeding with or without clots. If you develop a warm, red, tender area on your breast, have nipple discharge which is foul smelling or contains pus, or develop a fever. NEVER SHAKE A BABY PROMISE. Shaking can kill a baby. It can also cause seizures, brain damage, learning problems,  Cerebral palsy, blindness and other serious health and developmental problems. I PROMISE NEVER TO SHAKE MY BABY    I understand that caregivers other than the mother often shakes babies. I also promise to discuss the dangers of shaking a baby with everyone who takes care of my baby. I promise to tell anyone who care for my baby to never, never shake my baby.

## 2022-07-28 NOTE — DISCHARGE SUMMARY
Department of Obstetrics and Gynecology  Labor and Delivery  Discharge Summary    Patient:  Nicolasa Romero         Medical Record Number:  [id number]    Admit Date:  2022  9:13 AM    Discharge Date: 2022    Final Diagnosis:   Principal Problem (Resolved):    44 5/7 weeks gestation of pregnancy  Active Problems:    Anemia during pregnancy in third trimester- Taking FeSO4 BID    Gestational hypertension, third trimester starting at 37 weeks     (normal spontaneous vaginal delivery)    Term birth of male     Second degree laceration of perineum, delivered, current hospitalization    Right periurethral laceration, delivered, current hospitalization    Anxiety/Depression/ADHD disorder - on meds since 9 yo  Resolved Problems:    High-risk pregnancy, third trimester    Primigravida in third trimester    COVID-19 affecting pregnancy in second trimester at around 18 weeks along (EFW/KARLI at 29, 29 and 38 weeks)     Chlamydia infection affecting pregnancy in second trimester (16 weeks -> treated -> OLIVIA negative    Meconium in amniotic fluid    Late prenatal care complicating pregnancy, third trimester (16 4/7 weeks)    Rh negative state in antepartum period- Had rhogam      Chief Complaint - Presenting Illness - Physical Findings:   39 weeks gestation of pregnancy [Z3A.39]  HPI: The patient is a 32 y.o. W female  at 43w3d high risk primigravida at 43 weeks 4 days with gestational hypertension, history of COVID-19 exposure, late prenatal care and iron deficiency anemia who presents with a BS = 8 for Pitocin induction of labor. Patient's pregnancy is also been complicated by chlamydia infection in the second trimester which was treated. She is Rh-. She has a history of anxiety, depression and ADHD and was on medications prior to her pregnancy. She presents today without complaints. She denies contractions, leaking fluid and vaginal bleeding. She has no symptoms of UTI or PIH.   There is good fetal 37 weeks    Meconium in amniotic fluid     (normal spontaneous vaginal delivery)    Term birth of male     Second degree laceration of perineum, delivered, current hospitalization    Right periurethral laceration, delivered, current hospitalization         Information for the patient's :  [place], [address] [[id number]]   Normal Spontaneous Vaginal Delivery, uncomplicated  Delivering Physician: Hilda Coleman Wt:   Information for the patient's :  Dariel Nugent [[id number]]   8 pounds 6.4 ounces  3810 g     APGARS:            Information for the patient's :  Nolan Mcconnell [[id number]]   1 minute: 9  5 minute: 9     Anesthesia:  epidural anesthesia 1% lidocaine locally for repair     Application and Delivery:  32 y.o. W female  at 39w5d admitted for Pitocin induction of labor with a Tiwari score equal to 4 gestational hypertension and a history of COVID-19 at 18 weeks who progressed to complete post amniotomy and delivered Cephalic, right occiput anterior presentation via  @ 0018, under epidural anesthesia anesthesia,  over an intact perineum with a resulting 2nd degree laceration and a right periurethral mucosal laceration, without dystocia or complication, a Live Born Male infant, weighing 8# 6oz, 3810 grams, Meconium fluid at delivery, bulb suctioned on perineum. A nuchal cord was not present. A delayed cord clamping was performed. Spontaneous cry,  Apgar's 1 minute:  9; 5 minute:  9. The placenta was delivered with gentle traction and was noted to be intact, whole, and that the umbilical cord had three vessels noted. Episiotomy was not needed due to a spontaneous 2nd degree (subcutaneous tissue involved) perineal/vaginal laceration and a right periurethral mucosal laceration. Repair of the perineal laceration was performed in layers, per routine with  Vicryl 3-0 suture.   The right urethral laceration was repaired with simple interrupted stitches of the same suture. Cervix, rectum, sphincter intact. Sponge, needle, and instrument counts correct x 2. Delivery Summary:  Mother's Information       Labor Events     Labor?: No  Cervical Ripening: N/A   Now      Irish Breaker [[id number]]       Labor Events     Labor?: No   Steroids?: None  Cervical Ripening Date/Time: N/     Antibiotics Received during Labor?: No       Rupture Identifier: Sac 1   Rupture Date/Time: 22 13:50:00   Rupture Type: AROM  Fluid Color: Meconium  Meconium Consistency: Thin  Fluid Odor: None  Fluid Volume:  Moderate  Induction: Oxytocin  Labor Complications: None     Anesthesia    Method: Epidural, Local     Start Pushing       Labor onset date/time: 21 19:29:00 Now      Dilation complete date/time: 22 23:10:00 EDT Now      Start pushing date/time: 2022 23:20:00      Labor Length    1st stage: 3h 41m  2nd stage: 1h 08m  3rd stage: 0h 09m     Delivery ()       Delivery Date/Time:  22 00:18:00   Delivery Type: Vaginal, Spontaneous     [place] Presentation    Presentation: Vertex  Position: Right  _: Occiput  _: Anterior     Shoulder Dystocia    Shoulder Dystocia Present?: No  Assisted Delivery Details    Forceps Attempted?: No  Vacuum Extractor Attempted?: No     Cord    Vessels: 3 Vessels  Complications: None  Delayed Cord Clamping?: Yes  Cord Clamped Date/Time: 2022 12:19:00  Cord Blood Disposition: Lab  Gases Sent?: Yes     Placenta    Date/Time: 2022 00:27:00  Removal: Spontaneous  Appearance: Intact  Disposition: Placenta Refrigerator     Lacerations    Episiotomy: None  Perineal Lacerations: 2nd  Other Lacerations: periurethral laceration  Periurethral Laceration: Right Repaired?: Yes   Number of Repair Packets: 3     Vaginal Counts    Initial Count Personnel: DONALDO  Initial Count Verified By: Salome Gacria    Sponges Needles Instruments   Initial Counts Correct Correct Correct   Final Counts Correct Correct Correct   Final Count Personnel: Olena Inman  Final Count Verified By: Cj Khan  Accurate Final Count?: Yes  If the count is incorrect due to Intentionally Retained Foreign Object (IRFO) add the IRFO LDA in Lines/Drains. Add LDA: Link to [facility]     Blood Loss  Mother: Pamula Dakins #[id number]      Start of Mother's Information       Delivery Blood Loss  21 1929 - 22 0018        175 mL          End of Mother's Information  Mother: Pamula Dakins #[id number]        Delivery Providers    Delivering clinician: Baljeet Woo MD       Provider Role     Baljeet Woo MD Obstetrician     Bernadette Cordoba, RN Primary Nurse     Natalie Herrera RN Primary  Nurse            Assessment    Living Status: Living       Apgar Scoring Key:    0 1 2     Skin Color: Blue or pale Acrocyanotic Completely pink     Heart Rate: Absent <100 bpm >100 bpm     Reflex Irritability: No response Grimace Cry or active withdrawal     Muscle Tone: Limp Some flexion Active motion     Respiratory Effort: Absent Weak cry; hypoventilation Good, crying                         Skin Color:   Heart Rate:   Reflex Irritability:   Muscle Tone:   Respiratory Effort:    Total:            1 Minute:    1    2    2    2    2    9        Apgar 1 total from OB History    5 Minute:    1    2    2    2    2    9        Apgar 5 total from OB History               Apgars Assigned By: Charlene Hill           Resuscitation    Method: Bulb Suction, Room Air, Stimulation            Measurements       Birth Weight: 3810 g Birth Length: 0.521 m      Head Circumference: 0.345 m             Title       Skin to Skin Initiation Date/Time: 22 00:18:00 EDT      Skin to Skin With: Mother      Skin to Skin End Date/Time: 22 01:40:00      Breastfeeding Initiated Date/Time: 2022 01:45:00      Specimen: None                 Quantitative blood loss: 175     Condition:  infant stable to general nursery and mother stable to maternity     Blood Type and Rh: A NEG     Rubella Immunity Status:   Immune         Infant Feeding:    breast     Attending Attestation: I performed the procedure. The patient had an unremarkable Hospital Course. Her care was advanced per routine protocol. Her vital signs were stable, she remained afebrile, and her physical exam was unremarkable throughout her hospitalization. She was subsequently discharged home on the second Hospital Day as she was tolerating her diet, ambulating well, voiding without difficulty and had appropriate flatus with a bowel movement. Recent Results (from the past 72 hour(s))   FETAL SCREEN    Collection Time: 07/26/22  6:23 AM   Result Value Ref Range    Fetal Screen NEG    RH IMMUNE GLOBULIN    Collection Time: 07/26/22  6:23 AM   Result Value Ref Range    RHIG LOT NUMBER       RhImmuneGlobulin    [id number]          issued       1 vial  07/26/22 15:50     Physicians Following Patient During Hospitalization - Reason For Care:  Admitting Physician: Charli Keating  Delivering Physician: Behzad Martinez Physician(s):    PP#1 Charli Keating    PP#2 Charli Keating  Discharging Physician: Charli Keating  Consultant(s): n/a    Discharge Condition:  Level of Function:  Stable  Caregiver Arrangements and Educational Efforts: Written Discharge Instructions were verbally reviewed and given to the Patient. Special Problems: None    Plans For Continuing Care:  Unreported Test Results and Intended Follow-Up: 6 week(s) time. Instructions for Physical Activity: No driving for 1 week(s). No sex or tampon use for 6 weeks. No douching. No heavy lifting (greater than baby and carrier) for 6 weeks. Frequent ambulation with stairs - start slowly then increase as tolerated. Return to work in 6 weeks. Showers are fine - avoid bath tubs, hot tubs, swimming.    Instructions for Diet: Regular diet  Discharge Medications:  Current Discharge Medication List        START taking these medications    Details   benzocaine-menthol (DERMOPLAST) 20-0.5 % AERO spray Apply topically as needed for Pain . May use hospital sample as needed. Refills: 0      citalopram (CELEXA) 20 MG tablet Take 1 tablet by mouth in the morning. Qty: 90 tablet, Refills: 1      docusate sodium (COLACE, DULCOLAX) 100 MG CAPS Take 100 mg by mouth 2 times daily as needed for Constipation      ibuprofen (ADVIL;MOTRIN) 600 MG tablet Take 1 tablet by mouth every 6 hours as needed for Pain  Qty: 60 tablet, Refills: 1      lansinoh lanolin CREA ointment Apply 1 applicator topically as needed for Dry Skin (nipple discomfort)      oxyCODONE (ROXICODONE) 5 MG immediate release tablet Take 1 tablet by mouth every 6 hours as needed for Pain for up to 2 days. Qty: 8 tablet, Refills: 0    Comments: Reduce doses taken as pain becomes manageable  Associated Diagnoses: Pain following surgery or procedure      witch hazel-glycerin (TUCKS) pad Place rectally as needed. Refills: 0           CONTINUE these medications which have NOT CHANGED    Details   zolpidem (AMBIEN) 10 MG tablet Take 10 mg by mouth nightly as needed for Sleep. Prenat w/o J-JZ-Yksxxek-FA-DHA (PNV-DHA) 27-0.6-0.4-300 MG CAPS Take by mouth           STOP taking these medications       ferrous sulfate (FE TABS) 325 (65 Fe) MG EC tablet Comments:   Reason for Stopping: Follow-Up Visit Plan: In 6 weeks for final postpartum visit. Disposition: Home. Time Spent on Discharge:  30 minutes were spent in patient examination, evaluation, counseling as well as medication reconciliation, prescriptions for required medications, discharge plan and follow up.       Johanne Buckley MD MD,FACOG    7/28/2022 2:14 PM

## 2022-07-28 NOTE — PROGRESS NOTES
CLINICAL PHARMACY NOTE: MEDS TO BEDS    Total # of Prescriptions Filled: 3   The following medications were delivered to the patient:  Oxy 5  Ibu 600  Citalopram 20    Additional Documentation:

## 2022-07-28 NOTE — PROGRESS NOTES
PPD #2     Patient:  Wallace Centeno     Admit Date:  2022  9:13 AM  Medical Record Number:  [id number]   Today's Date: 2022    S: No complaints, doing well, ready for discharge home; tolerating diet: yes -General; ambulating well: yes -up in room; voiding without difficulty:  yes -has no urinary complaints; bm: yes -twice yesterday; flatus: yes -normal; pain meds appropriate: yes -ibuprofen 600 mg, would like a few Roxicodone to go home with just in case; vaginal bleeding: Less than a period. O:   Vitals:    22 1446 22 1859 22 2303 22 0715   BP: 122/65 (!) 141/84 (!) 140/78 (!) 155/83   Pulse: 75 82 75 72   Resp: 18 18 16 16   Temp: 98.3 °F (36.8 °C) 98.1 °F (36.7 °C) 98.2 °F (36.8 °C) 98.2 °F (36.8 °C)   TempSrc: Oral Oral Oral Oral   SpO2: 97% 96% 98% 98%   Weight:       Height:         Gen: A&O, cooperative, pleasant   Heart: RRR   Lungs: CTA b/l   Abd; soft, NT, non distended, +BS  Back: no CVA or paraspinal tenderness   Ext: No clubbing, cyanosis, edema:1+ , no cords palpable, no calf tenderness   Neuro: intact   Uterus: firm, well contracted, nt   Perineum: intact, healing well   Alma pad: staining only, thin lochia    Lab Results   Component Value Date    HGB 11.7 2022    HCT 35.8 2022      Recent Results (from the past 72 hour(s))   FETAL SCREEN    Collection Time: 22  6:23 AM   Result Value Ref Range    Fetal Screen NEG    RH IMMUNE GLOBULIN    Collection Time: 22  6:23 AM   Result Value Ref Range    RHIG LOT NUMBER       RhImmuneGlobulin    [id number]          issued       1 vial  22 15:50       A: 32 y.o. female  at 39w5d weeks  PPD #2 S/P ; Episiotomy was not needed due to a spontaneous 2nd degree (subcutaneous tissue involved) vaginal/perineal laceration and a right periurethral laceration.   Anxiety disorder, on medication since age 10 (Celexa 20 mg) -stable  Iron deficiency anemia in early pregnancy, resolved with iron replacement therapy  Patient Active Problem List   Diagnosis    Anxiety/Depression/ADHD disorder - on meds since 11 yo    High-risk pregnancy, third trimester    Primigravida in third trimester    COVID-19 affecting pregnancy in second trimester at around 18 weeks along (EFW/KARLI at 29, 29 and 38 weeks)     Late prenatal care complicating pregnancy, third trimester (16 4/7 weeks)    Anemia during pregnancy in third trimester- Taking FeSO4 BID    Rh negative state in antepartum period- Had rhogam    Chlamydia infection affecting pregnancy in second trimester (16 weeks -> treated -> OLIVIA negative    39 5/7 weeks gestation of pregnancy    Gestational hypertension, third trimester starting at 37 weeks    Meconium in amniotic fluid     (normal spontaneous vaginal delivery)    Term birth of male     Second degree laceration of perineum, delivered, current hospitalization    Right periurethral laceration, delivered, current hospitalization       P: Routine PP care. Discharge home with instructions, precautions. Prescription for Ibuprofen 600 mg. Roxicodone Rx for breakthrough pain. Continue PNV with Iron daily. May discontinue ferrous sulfate 325 mg. Follow-up office 6 weeks for postpartum visit.     Fabian Willis MD FACOG  2022 1:57 PM

## 2022-07-28 NOTE — DISCHARGE INSTR - DIET

## 2022-07-28 NOTE — DISCHARGE INSTR - ACTIVITY
After Your Delivery Discharge Instructions    What to do after you leave the hospital:    Recommended diet: regular diet  Recommended activity: No driving for 1 weeks, no sex or tampon use for 6 weeks. No douching. No heavy lifting (greater than baby and carrier) for 6 weeks. Initially, avoid frequent ambulation with stairs - start slowly then increase as tolerated. You may return to work in 6 weeks. Showers are fine - avoid bath tubs, hot tubs, swimming. Follow-up in 6 weeks for final postpartum visit. Call the Physician with any of these signs and symptoms:    Warning signs regarding stitches:  \"Popping\" of stitches  Foul smelling discharge or pus  More redness or streaks around stitches than before    Perineum / episiotomy care:  Continue care as in the hospital (sitz baths, squirt bottle, etc.)  No tub baths until OK'd by your Physician , showers are fine     After your delivery - signs and symptoms to watch for:  Fever - Oral temperature greater than 100.4 degrees Fahrenheit  Foul-smelling vaginal discharge  Headache unrelieved by \"pain meds\"  Difficulty urinating  Breasts reddened, hard, hot to the touch  Nipple discharge which is foul-smelling or contains pus  Increased pain at the site of the laceration repair  Difficulty breathing with or without chest pain  New calf pain especially if only on one side  Sudden, continuing increased vaginal bleeding (heavier than a period) with or without clots  Unrelieved feelings of:   Inability to cope  Sadness  Anxiety  Lack of interest in baby  Insomnia  Crying     What to do at home:  Resume activity gradually   Don't lift anything heavier than baby and carrier until OK'd by your Physician   No sex until OK'd by your Physician  Eat regular nutritious meals  Take pain medication as prescribed whenever you need them  To avoid/relieve constipation take stool softeners if advised   Increase fiber in your diet    Most importantly ENJOY your Baby!  - Dr. Seven Devi =)))    Please call immediately with Questions and Concerns - [phone] (Office) or [phone] (Answering Service) after hours and weekends. By signing below, I understand that if any emergent problems occur, once I leave the hospital, I am to dial #[phone] or go immediately to the nearest Emergency Room. For less emergent matters,  Dr. Laurie Hodge can be reached by calling his Office or  answering service at the above numbers. I understand and acknowledge receipt of the instructions indicated above.

## 2022-09-15 PROBLEM — O98.812 CHLAMYDIA INFECTION AFFECTING PREGNANCY IN SECOND TRIMESTER: Status: RESOLVED | Noted: 2022-06-22 | Resolved: 2022-09-15

## 2022-09-15 PROBLEM — Z3A.39 39 WEEKS GESTATION OF PREGNANCY: Status: RESOLVED | Noted: 2022-07-25 | Resolved: 2022-09-15

## 2022-09-15 PROBLEM — O13.3 GESTATIONAL HYPERTENSION, THIRD TRIMESTER: Status: ACTIVE | Noted: 2022-07-26

## 2022-09-15 PROBLEM — O09.33 LATE PRENATAL CARE COMPLICATING PREGNANCY, THIRD TRIMESTER: Status: RESOLVED | Noted: 2022-06-22 | Resolved: 2022-09-15

## 2022-09-15 PROBLEM — O98.512 COVID-19 AFFECTING PREGNANCY IN SECOND TRIMESTER: Status: RESOLVED | Noted: 2022-06-22 | Resolved: 2022-09-15

## 2022-09-15 PROBLEM — Z34.03 PRIMIGRAVIDA IN THIRD TRIMESTER: Status: RESOLVED | Noted: 2022-06-22 | Resolved: 2022-09-15

## 2022-09-15 PROBLEM — O26.899 RH NEGATIVE STATE IN ANTEPARTUM PERIOD: Status: RESOLVED | Noted: 2022-06-22 | Resolved: 2022-09-15

## 2022-09-15 PROBLEM — O13.3 GESTATIONAL HYPERTENSION, THIRD TRIMESTER: Status: RESOLVED | Noted: 2022-07-26 | Resolved: 2022-09-15

## 2022-09-15 PROBLEM — Z67.91 RH NEGATIVE STATE IN ANTEPARTUM PERIOD: Status: RESOLVED | Noted: 2022-06-22 | Resolved: 2022-09-15

## 2022-09-15 PROBLEM — O09.93 HIGH-RISK PREGNANCY, THIRD TRIMESTER: Status: RESOLVED | Noted: 2022-06-22 | Resolved: 2022-09-15

## 2022-09-15 PROBLEM — A74.9 CHLAMYDIA INFECTION AFFECTING PREGNANCY IN SECOND TRIMESTER: Status: RESOLVED | Noted: 2022-06-22 | Resolved: 2022-09-15

## 2022-09-15 PROBLEM — U07.1 COVID-19 AFFECTING PREGNANCY IN SECOND TRIMESTER: Status: RESOLVED | Noted: 2022-06-22 | Resolved: 2022-09-15

## 2024-09-11 ENCOUNTER — HOSPITAL ENCOUNTER (OUTPATIENT)
Age: 29
Discharge: HOME | End: 2024-09-11
Payer: COMMERCIAL

## 2024-09-11 LAB
ALBUMIN SERPL-MCNC: 4.4 G/DL (ref 3.5–5.2)
ALP SERPL-CCNC: 76 U/L (ref 35–104)
ALT SERPL-CCNC: 14 U/L (ref 0–32)
ANION GAP SERPL CALCULATED.3IONS-SCNC: 14 MMOL/L (ref 7–16)
AST SERPL-CCNC: 16 U/L (ref 0–31)
BASOPHILS # BLD: 0.03 K/UL (ref 0–0.2)
BASOPHILS NFR BLD: 1 % (ref 0–2)
BILIRUB SERPL-MCNC: 0.3 MG/DL (ref 0–1.2)
BUN SERPL-MCNC: 13 MG/DL (ref 6–20)
CALCIUM SERPL-MCNC: 9.3 MG/DL (ref 8.6–10.2)
CHLORIDE SERPL-SCNC: 102 MMOL/L (ref 98–107)
CHOLEST SERPL-MCNC: 150 MG/DL
CO2 SERPL-SCNC: 26 MMOL/L (ref 22–29)
CREAT SERPL-MCNC: 0.7 MG/DL (ref 0.5–1)
EKG ATRIAL RATE: 114 BPM
EKG P AXIS: 71 DEGREES
EKG P-R INTERVAL: 130 MS
EKG Q-T INTERVAL: 308 MS
EKG QRS DURATION: 76 MS
EKG QTC CALCULATION (BAZETT): 424 MS
EKG R AXIS: 97 DEGREES
EKG T AXIS: 40 DEGREES
EKG VENTRICULAR RATE: 114 BPM
EOSINOPHIL # BLD: 0.3 K/UL (ref 0.05–0.5)
EOSINOPHILS RELATIVE PERCENT: 5 % (ref 0–6)
ERYTHROCYTE [DISTWIDTH] IN BLOOD BY AUTOMATED COUNT: 12.8 % (ref 11.5–15)
GFR, ESTIMATED: >90 ML/MIN/1.73M2
GLUCOSE P FAST SERPL-MCNC: 92 MG/DL (ref 74–99)
GLUCOSE SERPL-MCNC: 92 MG/DL (ref 74–99)
HCT VFR BLD AUTO: 41.1 % (ref 34–48)
HDLC SERPL-MCNC: 71 MG/DL
HGB BLD-MCNC: 13.4 G/DL (ref 11.5–15.5)
IMM GRANULOCYTES # BLD AUTO: <0.03 K/UL (ref 0–0.58)
IMM GRANULOCYTES NFR BLD: 0 % (ref 0–5)
LDLC SERPL CALC-MCNC: 69 MG/DL
LYMPHOCYTES NFR BLD: 2.42 K/UL (ref 1.5–4)
LYMPHOCYTES RELATIVE PERCENT: 43 % (ref 20–42)
MCH RBC QN AUTO: 27.6 PG (ref 26–35)
MCHC RBC AUTO-ENTMCNC: 32.6 G/DL (ref 32–34.5)
MCV RBC AUTO: 84.6 FL (ref 80–99.9)
MONOCYTES NFR BLD: 0.45 K/UL (ref 0.1–0.95)
MONOCYTES NFR BLD: 8 % (ref 2–12)
NEUTROPHILS NFR BLD: 43 % (ref 43–80)
NEUTS SEG NFR BLD: 2.46 K/UL (ref 1.8–7.3)
PLATELET # BLD AUTO: 261 K/UL (ref 130–450)
PMV BLD AUTO: 9.2 FL (ref 7–12)
POTASSIUM SERPL-SCNC: 4 MMOL/L (ref 3.5–5)
PROT SERPL-MCNC: 7.3 G/DL (ref 6.4–8.3)
RBC # BLD AUTO: 4.86 M/UL (ref 3.5–5.5)
SODIUM SERPL-SCNC: 142 MMOL/L (ref 132–146)
T3FREE SERPL-MCNC: 2.94 PG/ML (ref 2–4.4)
T4 FREE SERPL-MCNC: 1.3 NG/DL (ref 0.9–1.7)
T4 SERPL-MCNC: 7.7 UG/DL (ref 4.5–11.7)
TRIGL SERPL-MCNC: 52 MG/DL
TSH SERPL DL<=0.05 MIU/L-ACNC: 2.31 UIU/ML (ref 0.27–4.2)
VLDLC SERPL CALC-MCNC: 10 MG/DL
WBC OTHER # BLD: 5.7 K/UL (ref 4.5–11.5)

## 2024-09-11 PROCEDURE — 80061 LIPID PANEL: CPT

## 2024-09-11 PROCEDURE — 93005 ELECTROCARDIOGRAM TRACING: CPT

## 2024-09-11 PROCEDURE — 93010 ELECTROCARDIOGRAM REPORT: CPT | Performed by: INTERNAL MEDICINE

## 2024-09-11 PROCEDURE — 84481 FREE ASSAY (FT-3): CPT

## 2024-09-11 PROCEDURE — 85025 COMPLETE CBC W/AUTO DIFF WBC: CPT

## 2024-09-11 PROCEDURE — 84439 ASSAY OF FREE THYROXINE: CPT

## 2024-09-11 PROCEDURE — 84443 ASSAY THYROID STIM HORMONE: CPT

## 2024-09-11 PROCEDURE — 80053 COMPREHEN METABOLIC PANEL: CPT

## 2024-09-11 PROCEDURE — 36415 COLL VENOUS BLD VENIPUNCTURE: CPT

## 2025-04-11 ENCOUNTER — OFFICE VISIT (OUTPATIENT)
Dept: CARDIOLOGY CLINIC | Age: 30
End: 2025-04-11
Payer: COMMERCIAL

## 2025-04-11 VITALS
RESPIRATION RATE: 18 BRPM | DIASTOLIC BLOOD PRESSURE: 78 MMHG | HEART RATE: 68 BPM | HEIGHT: 62 IN | BODY MASS INDEX: 21.09 KG/M2 | SYSTOLIC BLOOD PRESSURE: 126 MMHG | WEIGHT: 114.6 LBS

## 2025-04-11 DIAGNOSIS — R00.2 PALPITATIONS: ICD-10-CM

## 2025-04-11 DIAGNOSIS — R00.0 TACHYCARDIA: Primary | ICD-10-CM

## 2025-04-11 PROCEDURE — 99203 OFFICE O/P NEW LOW 30 MIN: CPT | Performed by: INTERNAL MEDICINE

## 2025-04-11 PROCEDURE — 93000 ELECTROCARDIOGRAM COMPLETE: CPT | Performed by: INTERNAL MEDICINE

## 2025-04-11 RX ORDER — LANOLIN ALCOHOL/MO/W.PET/CERES
50 CREAM (GRAM) TOPICAL DAILY
COMMUNITY

## 2025-04-11 RX ORDER — VITAMIN B COMPLEX
1 CAPSULE ORAL DAILY
COMMUNITY

## 2025-04-11 RX ORDER — PROPRANOLOL HCL 20 MG
20 TABLET ORAL DAILY
COMMUNITY
Start: 2025-03-29

## 2025-04-11 ASSESSMENT — ENCOUNTER SYMPTOMS
NAUSEA: 0
COUGH: 0
SHORTNESS OF BREATH: 0
DIARRHEA: 0
CONSTIPATION: 0
VOMITING: 0
BACK PAIN: 0
BLOOD IN STOOL: 0
ABDOMINAL PAIN: 0
WHEEZING: 0

## 2025-04-11 NOTE — PROGRESS NOTES
Patient was seen today and a 14 day monitor was placed. Monitor was ordered by Dr. Muñoz. The monitor was applied, instructions were given to the patient. Patient stated understanding and gave verbalize readback.   Monitor company: [employer]  Serial number: [id number]
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
## 2025-04-16 ENCOUNTER — TELEPHONE (OUTPATIENT)
Dept: ADMINISTRATIVE | Age: 30
End: 2025-04-16

## 2025-04-16 NOTE — TELEPHONE ENCOUNTER
[facility] requested office note be faxed to them from 4/11 visit, fax is [phone].

## 2025-05-02 DIAGNOSIS — R00.2 PALPITATIONS: ICD-10-CM

## 2025-05-20 ENCOUNTER — TELEPHONE (OUTPATIENT)
Dept: CARDIOLOGY | Age: 30
End: 2025-05-20

## 2025-05-20 NOTE — TELEPHONE ENCOUNTER
Left message for patient to confirm stress and echo 5/22/2025 starting at 1130 am. Instructions and medications reviewed. Patient to hold propanolol for 24 hours. Patient encouraged to call with any questions or concerns.

## 2025-05-22 ENCOUNTER — HOSPITAL ENCOUNTER (OUTPATIENT)
Dept: CARDIOLOGY | Age: 30
Discharge: HOME | End: 2025-05-24
Attending: INTERNAL MEDICINE
Payer: COMMERCIAL

## 2025-05-22 VITALS
SYSTOLIC BLOOD PRESSURE: 126 MMHG | HEIGHT: 63 IN | WEIGHT: 114 LBS | DIASTOLIC BLOOD PRESSURE: 78 MMHG | BODY MASS INDEX: 20.2 KG/M2

## 2025-05-22 VITALS
SYSTOLIC BLOOD PRESSURE: 100 MMHG | RESPIRATION RATE: 18 BRPM | HEART RATE: 123 BPM | HEIGHT: 63 IN | DIASTOLIC BLOOD PRESSURE: 70 MMHG | BODY MASS INDEX: 20.2 KG/M2 | WEIGHT: 114 LBS

## 2025-05-22 DIAGNOSIS — R00.2 PALPITATIONS: ICD-10-CM

## 2025-05-22 LAB
ECHO AO ASC DIAM: 2.7 CM
ECHO AO ASCENDING AORTA INDEX: 1.78 CM/M2
ECHO AV AREA PEAK VELOCITY: 3.3 CM2
ECHO AV AREA VTI: 4.4 CM2
ECHO AV AREA/BSA PEAK VELOCITY: 2.2 CM2/M2
ECHO AV AREA/BSA VTI: 2.9 CM2/M2
ECHO AV CUSP MM: 1.8 CM
ECHO AV MEAN GRADIENT: 2 MMHG
ECHO AV MEAN VELOCITY: 0.6 M/S
ECHO AV PEAK GRADIENT: 5 MMHG
ECHO AV PEAK VELOCITY: 1.1 M/S
ECHO AV VELOCITY RATIO: 0.82
ECHO AV VTI: 15.2 CM
ECHO BSA: 1.52 M2
ECHO BSA: 1.52 M2
ECHO EST RA PRESSURE: 3 MMHG
ECHO LA DIAMETER INDEX: 1.45 CM/M2
ECHO LA DIAMETER: 2.2 CM
ECHO LA VOL A-L A2C: 17 ML (ref 22–52)
ECHO LA VOL A-L A4C: 14 ML (ref 22–52)
ECHO LA VOL MOD A2C: 16 ML (ref 22–52)
ECHO LA VOL MOD A4C: 11 ML (ref 22–52)
ECHO LA VOLUME AREA LENGTH: 17 ML
ECHO LA VOLUME INDEX A-L A2C: 11 ML/M2 (ref 16–34)
ECHO LA VOLUME INDEX A-L A4C: 9 ML/M2 (ref 16–34)
ECHO LA VOLUME INDEX AREA LENGTH: 11 ML/M2 (ref 16–34)
ECHO LA VOLUME INDEX MOD A2C: 11 ML/M2 (ref 16–34)
ECHO LA VOLUME INDEX MOD A4C: 7 ML/M2 (ref 16–34)
ECHO LV EF PHYSICIAN: 70 %
ECHO LV FRACTIONAL SHORTENING: 25 % (ref 28–44)
ECHO LV INTERNAL DIMENSION DIASTOLE INDEX: 1.84 CM/M2
ECHO LV INTERNAL DIMENSION DIASTOLIC: 2.8 CM (ref 3.9–5.3)
ECHO LV INTERNAL DIMENSION SYSTOLIC INDEX: 1.38 CM/M2
ECHO LV INTERNAL DIMENSION SYSTOLIC: 2.1 CM
ECHO LV IVSD: 0.8 CM (ref 0.6–0.9)
ECHO LV MASS 2D: 53.2 G (ref 67–162)
ECHO LV MASS INDEX 2D: 35 G/M2 (ref 43–95)
ECHO LV POSTERIOR WALL DIASTOLIC: 0.8 CM (ref 0.6–0.9)
ECHO LV RELATIVE WALL THICKNESS RATIO: 0.57
ECHO LVOT AREA: 3.8 CM2
ECHO LVOT AV VTI INDEX: 1.16
ECHO LVOT DIAM: 2.2 CM
ECHO LVOT MEAN GRADIENT: 2 MMHG
ECHO LVOT PEAK GRADIENT: 4 MMHG
ECHO LVOT PEAK VELOCITY: 0.9 M/S
ECHO LVOT STROKE VOLUME INDEX: 44 ML/M2
ECHO LVOT SV: 66.9 ML
ECHO LVOT VTI: 17.6 CM
ECHO MV A VELOCITY: 0.9 M/S
ECHO MV AREA PHT: 3.7 CM2
ECHO MV AREA VTI: 4.6 CM2
ECHO MV E DECELERATION TIME (DT): 223.5 MS
ECHO MV E VELOCITY: 0.71 M/S
ECHO MV E/A RATIO: 0.79
ECHO MV LVOT VTI INDEX: 0.82
ECHO MV MAX VELOCITY: 1 M/S
ECHO MV MEAN GRADIENT: 2 MMHG
ECHO MV MEAN VELOCITY: 0.6 M/S
ECHO MV PEAK GRADIENT: 4 MMHG
ECHO MV PRESSURE HALF TIME (PHT): 58.9 MS
ECHO MV VTI: 14.5 CM
ECHO PV MAX VELOCITY: 0.8 M/S
ECHO PV MEAN GRADIENT: 1 MMHG
ECHO PV MEAN VELOCITY: 0.5 M/S
ECHO PV PEAK GRADIENT: 3 MMHG
ECHO PV VTI: 14.1 CM
ECHO RIGHT VENTRICULAR SYSTOLIC PRESSURE (RVSP): 16 MMHG
ECHO RV INTERNAL DIMENSION: 2.1 CM
ECHO RV TAPSE: 1.7 CM (ref 1.7–?)
ECHO TV REGURGITANT MAX VELOCITY: 1.82 M/S
ECHO TV REGURGITANT PEAK GRADIENT: 13 MMHG
STRESS BASELINE DIAS BP: 84 MMHG
STRESS BASELINE HR: 123 BPM
STRESS BASELINE SYS BP: 120 MMHG
STRESS ESTIMATED WORKLOAD: 10 METS
STRESS EXERCISE DUR MIN: 7 MIN
STRESS EXERCISE DUR SEC: 9 SEC
STRESS O2 SAT PEAK: 99 %
STRESS O2 SAT REST: 98 %
STRESS PEAK DIAS BP: 90 MMHG
STRESS PEAK SYS BP: 134 MMHG
STRESS PERCENT HR ACHIEVED: 99 %
STRESS POST PEAK HR: 187 BPM
STRESS RATE PRESSURE PRODUCT: NORMAL BPM*MMHG
STRESS ST DEPRESSION: 0 MM
STRESS TARGET HR: 188 BPM

## 2025-05-22 PROCEDURE — 93017 CV STRESS TEST TRACING ONLY: CPT

## 2025-05-22 PROCEDURE — 93306 TTE W/DOPPLER COMPLETE: CPT | Performed by: INTERNAL MEDICINE

## 2025-05-22 PROCEDURE — 93306 TTE W/DOPPLER COMPLETE: CPT

## 2025-05-22 RX ORDER — DEXTROAMPHETAMINE SACCHARATE, AMPHETAMINE ASPARTATE MONOHYDRATE, DEXTROAMPHETAMINE SULFATE AND AMPHETAMINE SULFATE 3.75; 3.75; 3.75; 3.75 MG/1; MG/1; MG/1; MG/1
15 CAPSULE, EXTENDED RELEASE ORAL EVERY MORNING
COMMUNITY